# Patient Record
Sex: MALE | Race: WHITE
[De-identification: names, ages, dates, MRNs, and addresses within clinical notes are randomized per-mention and may not be internally consistent; named-entity substitution may affect disease eponyms.]

---

## 2017-02-17 ENCOUNTER — HOSPITAL ENCOUNTER (EMERGENCY)
Dept: HOSPITAL 62 - ER | Age: 37
Discharge: HOME | End: 2017-02-17
Payer: SELF-PAY

## 2017-02-17 VITALS — DIASTOLIC BLOOD PRESSURE: 100 MMHG | SYSTOLIC BLOOD PRESSURE: 141 MMHG

## 2017-02-17 DIAGNOSIS — S02.82XA: ICD-10-CM

## 2017-02-17 DIAGNOSIS — F17.200: ICD-10-CM
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
## 2017-02-17 DIAGNOSIS — R51: ICD-10-CM
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
## 2017-02-17 DIAGNOSIS — S02.2XXA: Primary | ICD-10-CM

## 2017-02-17 DIAGNOSIS — S02.40DA: ICD-10-CM

## 2017-02-17 PROCEDURE — L0120 CERV FLEX N/ADJ FOAM PRE OTS: HCPCS

## 2017-02-17 PROCEDURE — 70486 CT MAXILLOFACIAL W/O DYE: CPT

## 2017-02-17 PROCEDURE — 72125 CT NECK SPINE W/O DYE: CPT

## 2017-02-17 PROCEDURE — 70450 CT HEAD/BRAIN W/O DYE: CPT

## 2017-02-17 PROCEDURE — 99284 EMERGENCY DEPT VISIT MOD MDM: CPT
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
## 2017-04-25 ENCOUNTER — HOSPITAL ENCOUNTER (EMERGENCY)
Dept: HOSPITAL 62 - ER | Age: 37
Discharge: HOME | End: 2017-04-25
Payer: SELF-PAY

## 2017-04-25 VITALS — SYSTOLIC BLOOD PRESSURE: 126 MMHG | DIASTOLIC BLOOD PRESSURE: 85 MMHG

## 2017-04-25 DIAGNOSIS — M25.551: ICD-10-CM

## 2017-04-25 DIAGNOSIS — R10.9: ICD-10-CM
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
## 2017-04-25 DIAGNOSIS — B36.0: ICD-10-CM
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
## 2017-04-25 DIAGNOSIS — R07.81: ICD-10-CM

## 2017-04-25 DIAGNOSIS — T14.8: Primary | ICD-10-CM

## 2017-04-25 DIAGNOSIS — F17.200: ICD-10-CM

## 2017-04-25 PROCEDURE — 71260 CT THORAX DX C+: CPT

## 2017-04-25 PROCEDURE — 74177 CT ABD & PELVIS W/CONTRAST: CPT

## 2017-04-25 PROCEDURE — 99283 EMERGENCY DEPT VISIT LOW MDM: CPT

## 2017-04-25 PROCEDURE — 96374 THER/PROPH/DIAG INJ IV PUSH: CPT
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
## 2019-02-07 ENCOUNTER — HOSPITAL ENCOUNTER (EMERGENCY)
Dept: HOSPITAL 62 - ER | Age: 39
Discharge: HOME | End: 2019-02-07
Payer: SELF-PAY

## 2019-02-07 VITALS — SYSTOLIC BLOOD PRESSURE: 122 MMHG | DIASTOLIC BLOOD PRESSURE: 80 MMHG

## 2019-02-07 DIAGNOSIS — F17.200: ICD-10-CM

## 2019-02-07 DIAGNOSIS — S69.91XA: Primary | ICD-10-CM
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
## 2019-02-07 PROCEDURE — 99283 EMERGENCY DEPT VISIT LOW MDM: CPT

## 2019-02-07 NOTE — RADIOLOGY REPORT (SQ)
EXAM DESCRIPTION:  HAND RIGHT 3 VIEWS



COMPLETED DATE/TIME:  2/7/2019 1:37 pm



REASON FOR STUDY:  injury



COMPARISON:  None.



EXAM PARAMETERS:  NUMBER OF VIEWS: Three views.

TECHNIQUE: AP, lateral and oblique  radiographic images acquired of the right hand.

LIMITATIONS: None.



FINDINGS:  MINERALIZATION: Normal.

BONES: No acute fracture or dislocation.  Old avulsion fragment along the dorsal base 5th metacarpal 
at the carpometacarpal joint.

JOINTS: No effusions.

SOFT TISSUES: No soft tissue swelling.  No foreign body.

OTHER: No other significant finding.



IMPRESSION:  No acute fracture.

Old avulsion fragment along the dorsal base 5th metacarpal at the carpometacarpal joint.



TECHNICAL DOCUMENTATION:  JOB ID:  [id number]

 2011 [employer]- All Rights Reserved



Reading location - IP/workstation name: [email or web address]
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
